# Patient Record
Sex: FEMALE | Race: WHITE
[De-identification: names, ages, dates, MRNs, and addresses within clinical notes are randomized per-mention and may not be internally consistent; named-entity substitution may affect disease eponyms.]

---

## 2020-09-11 PROBLEM — Z00.00 ENCOUNTER FOR PREVENTIVE HEALTH EXAMINATION: Status: ACTIVE | Noted: 2020-09-11

## 2020-10-21 ENCOUNTER — APPOINTMENT (OUTPATIENT)
Dept: ENDOCRINOLOGY | Facility: CLINIC | Age: 30
End: 2020-10-21
Payer: COMMERCIAL

## 2020-10-21 DIAGNOSIS — B97.7 PAPILLOMAVIRUS AS THE CAUSE OF DISEASES CLASSIFIED ELSEWHERE: ICD-10-CM

## 2020-10-21 PROCEDURE — 99204 OFFICE O/P NEW MOD 45 MIN: CPT | Mod: 95

## 2021-08-24 ENCOUNTER — APPOINTMENT (OUTPATIENT)
Dept: ENDOCRINOLOGY | Facility: CLINIC | Age: 31
End: 2021-08-24

## 2021-11-15 NOTE — DATA REVIEWED
[FreeTextEntry1] : Thyroid ultrasound (February 14, 2020) reviewed and significant for:\par ISTHMUS: Normal size in AP dimension measuring 0.2 cm.\par \par RIGHT LOBE:  Measures 4.5 x 1.3 x 1.3 cm in sagittal x AP x transverse dimensions.  Volume 3.9 cc.\par ARCHITECTURE: Heterogeneous without a discrete nodule.\par COLOR DOPPLER:  Unremarkable. \par \par LEFT LOBE:    Measures 4.0 x 1.2 x 0.9 cm in sagittal x AP x transverse dimensions. Volume 2.2 cc.\par ARCHITECTURE: Heterogeneous without a discrete nodule.\par COLOR DOPPLER:  Unremarkable. \par \par IMPRESSION:\par Heterogeneous thyroid gland without evidence of a discrete nodule.

## 2021-11-15 NOTE — ADDENDUM
[FreeTextEntry1] : Recent laboratory results as below; discussed with Ms. Johns. TSH 3.28 uIU/mL. She has recently moved and may have forgotten to take a few doses of levothyroxine. We can adjust her dose of levothyroxine from 50 mcg daily to 50 mcg, 1 pill 6 days/week and 2 pills 1 day/week. We will plan to repeat TSH in 8-12 weeks. 6/24/21\par \par Ms. Goddard called because she recently discovered she is pregnant. Her last menstrual period was October 2, 2021. I recommended we adjust her dose of levothyroxine to 75 mcg daily. 11/09/21\par \par Recent TSH 1.44 uIU/mL; recommend adjusting levothyroxine as above due to increased requirements in the first trimester. I left a telephone message for Ms. Goddard to discuss. 11/12/21\par \par I spoke with Ms. Goddard; she was diagnosed with an ectopic pregnancy and will have medical therapy in 48 hours. I advised she resume her previous dose of levothyroxine 50 mcg, 1 pill 6 days/week and 2 pills 1 day/week. 11/15/21\par \par Laboratories (November 11, 2021) reviewed and significant for:\par TSH 1.44 uIU/mL (normal: 0.40-4.50)\par \par Laboratories (June 23, 2021) reviewed and significant for:\par TSH 3.28 uIU/mL (normal: 0.40-4.50)\par Free T4 1.4 ng/dL (normal: 0.8-1.8)\par Thyroid peroxidase antibodies 140 IU/mL (normal: <9)\par Thyroglobulin antibodies 6 IU/mL (normal: <=1)

## 2021-11-15 NOTE — HISTORY OF PRESENT ILLNESS
[FreeTextEntry1] : Ms. Johns is a 30 year-old woman with a history of hypothyroidism presenting to [facility] care with me. \par \par Hypothyroidism. \par She was diagnosed with hypothyroidism in 2011.\par She has been on levothyroxine 50 mcg daily since that time. \par She is taking levothyroxine in the morning, on an empty stomach, with plain water, and waiting at least 30 minutes before eating. She is taking a multivitamin occasionally and  from levothyroxine by at least four hours. \par TSH 2.58 uIU/mL in February 2020. Thyroid ultrasound in February 2020 with a heterogenous gland but no discrete nodules. \par No history of radiation exposure.\par No known family history of thyroid disease.\par \par She has some fatigue she attributes to quarantine.

## 2021-11-15 NOTE — ASSESSMENT
[FreeTextEntry1] : Hypothyroidism. We discussed the pathophysiology and management of hypothyroidism. She has been clinically and biochemically euthyroid on her current regimen of levothyroxine. We reviewed proper use and compliance with levothyroxine. We discussed the increased levothyroxine requirements in pregnancy and she will call the office immediately if she becomes pregnant.\par Continue levothyroxine 50 mcg daily for goal TSH 0.5-2.5 uIU/mL\par \par CC:\par Dr. Abiola Jara, Fax [phone]

## 2021-11-24 ENCOUNTER — NON-APPOINTMENT (OUTPATIENT)
Age: 31
End: 2021-11-24

## 2021-12-06 ENCOUNTER — APPOINTMENT (OUTPATIENT)
Dept: ENDOCRINOLOGY | Facility: CLINIC | Age: 31
End: 2021-12-06

## 2022-01-26 ENCOUNTER — APPOINTMENT (OUTPATIENT)
Dept: ENDOCRINOLOGY | Facility: CLINIC | Age: 32
End: 2022-01-26
Payer: COMMERCIAL

## 2022-01-26 DIAGNOSIS — E03.9 HYPOTHYROIDISM, UNSPECIFIED: ICD-10-CM

## 2022-01-26 DIAGNOSIS — Z87.59 PERSONAL HISTORY OF OTHER COMPLICATIONS OF PREGNANCY, CHILDBIRTH AND THE PUERPERIUM: ICD-10-CM

## 2022-01-26 DIAGNOSIS — R53.83 OTHER FATIGUE: ICD-10-CM

## 2022-01-26 PROCEDURE — 99214 OFFICE O/P EST MOD 30 MIN: CPT | Mod: 95

## 2022-01-26 RX ORDER — MULTIVITAMIN
TABLET ORAL
Refills: 0 | Status: DISCONTINUED | COMMUNITY
End: 2022-01-26

## 2022-01-26 RX ORDER — PRENATAL VIT 49/IRON FUM/FOLIC 6.75-0.2MG
TABLET ORAL
Refills: 0 | Status: ACTIVE | COMMUNITY

## 2022-01-26 NOTE — PHYSICAL EXAM
[Alert] : alert [Healthy Appearance] : healthy appearance [No Acute Distress] : no acute distress [Normal Insight/Judgement] : insight and judgment were intact [Normal Sclera/Conjunctiva] : normal sclera/conjunctiva [Normal Hearing] : hearing was normal [No Respiratory Distress] : no respiratory distress

## 2022-03-24 ENCOUNTER — APPOINTMENT (OUTPATIENT)
Dept: HUMAN REPRODUCTION | Facility: CLINIC | Age: 32
End: 2022-03-24
Payer: COMMERCIAL

## 2022-03-24 ENCOUNTER — APPOINTMENT (OUTPATIENT)
Dept: HUMAN REPRODUCTION | Facility: CLINIC | Age: 32
End: 2022-03-24

## 2022-03-24 PROCEDURE — 99205 OFFICE O/P NEW HI 60 MIN: CPT | Mod: 95

## 2022-04-06 ENCOUNTER — APPOINTMENT (OUTPATIENT)
Dept: HUMAN REPRODUCTION | Facility: CLINIC | Age: 32
End: 2022-04-06
Payer: COMMERCIAL

## 2022-04-06 PROCEDURE — 76830 TRANSVAGINAL US NON-OB: CPT

## 2022-04-06 PROCEDURE — 36415 COLL VENOUS BLD VENIPUNCTURE: CPT

## 2022-04-06 PROCEDURE — 99213 OFFICE O/P EST LOW 20 MIN: CPT | Mod: 25

## 2022-04-15 ENCOUNTER — APPOINTMENT (OUTPATIENT)
Dept: HUMAN REPRODUCTION | Facility: CLINIC | Age: 32
End: 2022-04-15

## 2022-04-23 ENCOUNTER — TRANSCRIPTION ENCOUNTER (OUTPATIENT)
Age: 32
End: 2022-04-23

## 2022-04-25 ENCOUNTER — TRANSCRIPTION ENCOUNTER (OUTPATIENT)
Age: 32
End: 2022-04-25

## 2022-04-26 ENCOUNTER — TRANSCRIPTION ENCOUNTER (OUTPATIENT)
Age: 32
End: 2022-04-26

## 2022-04-26 NOTE — DATA REVIEWED
[FreeTextEntry1] : Laboratories (November 11, 2021) reviewed and significant for:\par TSH 1.44 uIU/mL (normal: 0.40-4.50)\par \par Laboratories (June 23, 2021) reviewed and significant for:\par TSH 3.28 uIU/mL (normal: 0.40-4.50)\par Free T4 1.4 ng/dL (normal: 0.8-1.8)\par Thyroid peroxidase antibodies 140 IU/mL (normal: <9)\par Thyroglobulin antibodies 6 IU/mL (normal: <=1) \par \par Thyroid ultrasound (February 14, 2020) reviewed and significant for:\par ISTHMUS: Normal size in AP dimension measuring 0.2 cm.\par RIGHT LOBE:  Measures 4.5 x 1.3 x 1.3 cm in sagittal x AP x transverse dimensions.  Volume 3.9 cc.\par ARCHITECTURE: Heterogeneous without a discrete nodule.\par COLOR DOPPLER:  Unremarkable. \par LEFT LOBE:    Measures 4.0 x 1.2 x 0.9 cm in sagittal x AP x transverse dimensions. Volume 2.2 cc.\par ARCHITECTURE: Heterogeneous without a discrete nodule.\par COLOR DOPPLER:  Unremarkable. \par IMPRESSION:\par Heterogeneous thyroid gland without evidence of a discrete nodule.

## 2022-04-26 NOTE — HISTORY OF PRESENT ILLNESS
[Home] : at home, [unfilled] , at the time of the visit. [Medical Office: ([facility])___] : at the medical office located in  [Verbal consent obtained from patient] : the patient, [unfilled] [FreeTextEntry1] : Ms. Goddard is a 31 year-old woman presenting for follow-up of hypothyroidism. I saw her for an initial visit in October 2020.\par \par Hypothyroidism. \par She was diagnosed with hypothyroidism in 2011.\par She has been on levothyroxine 50 mcg daily. \par She is taking levothyroxine in the morning, on an empty stomach, with plain water, and waiting at least 30 minutes before eating. She is taking a prenatal vitamin and  from levothyroxine by at least four hours. \par Thyroid ultrasound in February 2020 with a heterogenous gland but no discrete nodules. \par No history of radiation exposure.\par No known family history of thyroid disease.\par \par Interim History \par See notes for interim history. She had an ectopic pregnancy in November\par She moved to [place] although she still works in [place].\par She has had low energy. She has had dry skin. She had a menstrual period six weeks after termination of her ectopic pregnancy.\par Medical and surgical history, medications, allergies, social and family history reviewed and updated as needed.

## 2022-04-26 NOTE — ASSESSMENT
[FreeTextEntry1] : Hypothyroidism. She has been clinically and biochemically euthyroid on her current regimen of levothyroxine. We reviewed proper use and compliance with levothyroxine. We discussed the increased levothyroxine requirements in pregnancy and she will call the office immediately if she becomes pregnant.\par Continue levothyroxine 50 mcg daily pending TSH for goal 0.5-2.5 uIU/mL\par \par Fatigue. We will send laboratory evaluation as below.\par \par CC:\par Dr. Abiola Jara, Fax [phone]

## 2022-04-26 NOTE — ADDENDUM
[FreeTextEntry1] : Recent laboratory results as below. Glucose low, presumed due to a prolonged processing time in the laboratory. TSH within range. Vitamin D within the [employer] range. Other test results within range. I left a telephone message for Ms. Goddard for call back to discuss. 2/24/22\par \par Ms. Goddard had a possible blocked parotid or salivary duct, evaluated by an oral surgeon. I recommended she see an otorhinolaryngologist. 3/28/22\par \par I received a Portal message from Ms. Goddard regarding recent TSH 4.00 uIU/mL. We will adjust her dose of levothyroxine to 75 mcg daily, with plan to repeat TSH in 5-8 weeks. 4/26/22\par \par Laboratories (April 6, 2022) reviewed and significant for: \par TSH 4.00 uIU/mL\par \par Laboratories (February 23, 2022) reviewed and significant for: \par Unremarkable complete blood count\par Glucose 47 mg/dL (normal: ), otherwise unremarkable comprehensive metabolic panel\par HbA1c 5.1%\par TSH 1.51 uIU/mL\par LDL 88 mg/dL\par HDL 80 mg/dL\par Total cholesterol 188 mg/dL\par Triglycerides 195 mg/dL\par Vitamin B12 427 pg/mL\par 25-hydroxyvitamin D 21 ng/mL

## 2022-04-27 ENCOUNTER — TRANSCRIPTION ENCOUNTER (OUTPATIENT)
Age: 32
End: 2022-04-27

## 2022-05-19 ENCOUNTER — APPOINTMENT (OUTPATIENT)
Dept: HUMAN REPRODUCTION | Facility: CLINIC | Age: 32
End: 2022-05-19

## 2022-06-06 ENCOUNTER — TRANSCRIPTION ENCOUNTER (OUTPATIENT)
Age: 32
End: 2022-06-06

## 2022-06-30 ENCOUNTER — APPOINTMENT (OUTPATIENT)
Dept: HUMAN REPRODUCTION | Facility: CLINIC | Age: 32
End: 2022-06-30

## 2022-06-30 PROCEDURE — 99215 OFFICE O/P EST HI 40 MIN: CPT | Mod: 95

## 2022-07-01 ENCOUNTER — TRANSCRIPTION ENCOUNTER (OUTPATIENT)
Age: 32
End: 2022-07-01

## 2022-07-07 ENCOUNTER — APPOINTMENT (OUTPATIENT)
Dept: HUMAN REPRODUCTION | Facility: CLINIC | Age: 32
End: 2022-07-07

## 2022-07-07 PROCEDURE — 36415 COLL VENOUS BLD VENIPUNCTURE: CPT

## 2022-07-26 ENCOUNTER — APPOINTMENT (OUTPATIENT)
Dept: HUMAN REPRODUCTION | Facility: CLINIC | Age: 32
End: 2022-07-26

## 2022-07-26 PROCEDURE — 36415 COLL VENOUS BLD VENIPUNCTURE: CPT

## 2022-10-04 ENCOUNTER — NON-APPOINTMENT (OUTPATIENT)
Age: 32
End: 2022-10-04

## 2022-10-05 ENCOUNTER — TRANSCRIPTION ENCOUNTER (OUTPATIENT)
Age: 32
End: 2022-10-05

## 2023-01-18 ENCOUNTER — TRANSCRIPTION ENCOUNTER (OUTPATIENT)
Age: 33
End: 2023-01-18

## 2023-01-27 ENCOUNTER — TRANSCRIPTION ENCOUNTER (OUTPATIENT)
Age: 33
End: 2023-01-27

## 2023-01-27 RX ORDER — LEVOTHYROXINE SODIUM 0.07 MG/1
75 TABLET ORAL
Qty: 90 | Refills: 0 | Status: ACTIVE | COMMUNITY
Start: 1900-01-01 | End: 1900-01-01

## 2024-05-06 ENCOUNTER — TRANSCRIPTION ENCOUNTER (OUTPATIENT)
Age: 34
End: 2024-05-06

## 2024-09-19 ENCOUNTER — APPOINTMENT (OUTPATIENT)
Dept: SURGERY | Facility: CLINIC | Age: 34
End: 2024-09-19

## 2024-11-06 ENCOUNTER — APPOINTMENT (OUTPATIENT)
Dept: PEDIATRIC CARDIOLOGY | Facility: CLINIC | Age: 34
End: 2024-11-06

## 2024-11-06 PROCEDURE — 99203 OFFICE O/P NEW LOW 30 MIN: CPT | Mod: 25

## 2024-11-06 PROCEDURE — 76820 UMBILICAL ARTERY ECHO: CPT | Mod: 26

## 2024-11-06 PROCEDURE — 76825 ECHO EXAM OF FETAL HEART: CPT

## 2024-11-06 PROCEDURE — 76827 ECHO EXAM OF FETAL HEART: CPT

## 2024-11-06 PROCEDURE — 76821 MIDDLE CEREBRAL ARTERY ECHO: CPT | Mod: 26

## 2025-03-04 ENCOUNTER — RESULT REVIEW (OUTPATIENT)
Age: 35
End: 2025-03-04

## 2025-03-04 ENCOUNTER — TRANSCRIPTION ENCOUNTER (OUTPATIENT)
Age: 35
End: 2025-03-04

## 2025-03-07 ENCOUNTER — TRANSCRIPTION ENCOUNTER (OUTPATIENT)
Age: 35
End: 2025-03-07

## 2025-05-16 ENCOUNTER — NON-APPOINTMENT (OUTPATIENT)
Age: 35
End: 2025-05-16